# Patient Record
Sex: MALE | Race: WHITE | NOT HISPANIC OR LATINO | Employment: FULL TIME | URBAN - NONMETROPOLITAN AREA
[De-identification: names, ages, dates, MRNs, and addresses within clinical notes are randomized per-mention and may not be internally consistent; named-entity substitution may affect disease eponyms.]

---

## 2018-06-01 ENCOUNTER — HOSPITAL ENCOUNTER (OUTPATIENT)
Dept: PHYSICAL THERAPY | Facility: HOSPITAL | Age: 58
Setting detail: THERAPIES SERIES
Discharge: HOME | End: 2018-06-01

## 2018-06-05 ENCOUNTER — HOSPITAL ENCOUNTER (OUTPATIENT)
Dept: PHYSICAL THERAPY | Facility: HOSPITAL | Age: 58
Setting detail: THERAPIES SERIES
Discharge: HOME | End: 2018-06-05

## 2018-06-06 ENCOUNTER — HOSPITAL ENCOUNTER (OUTPATIENT)
Dept: PHYSICAL THERAPY | Facility: HOSPITAL | Age: 58
Setting detail: THERAPIES SERIES
Discharge: HOME | End: 2018-06-06

## 2018-06-06 PROCEDURE — 97750 PHYSICAL PERFORMANCE TEST: CPT

## 2019-02-20 RX ORDER — LISINOPRIL 40 MG/1
40 TABLET ORAL DAILY
COMMUNITY

## 2019-02-20 RX ORDER — AMLODIPINE BESYLATE 5 MG/1
5 TABLET ORAL DAILY
COMMUNITY

## 2019-02-22 ENCOUNTER — ANESTHESIA (OUTPATIENT)
Dept: PERIOP | Facility: HOSPITAL | Age: 59
End: 2019-02-22

## 2019-02-22 ENCOUNTER — ANESTHESIA EVENT (OUTPATIENT)
Dept: PERIOP | Facility: HOSPITAL | Age: 59
End: 2019-02-22

## 2019-02-22 ENCOUNTER — HOSPITAL ENCOUNTER (OUTPATIENT)
Facility: HOSPITAL | Age: 59
Setting detail: HOSPITAL OUTPATIENT SURGERY
Discharge: HOME | End: 2019-02-22
Attending: OPHTHALMOLOGY | Admitting: OPHTHALMOLOGY

## 2019-02-22 VITALS
OXYGEN SATURATION: 97 % | SYSTOLIC BLOOD PRESSURE: 148 MMHG | HEIGHT: 69 IN | DIASTOLIC BLOOD PRESSURE: 99 MMHG | RESPIRATION RATE: 20 BRPM | HEART RATE: 99 BPM | TEMPERATURE: 98 F | WEIGHT: 178 LBS | BODY MASS INDEX: 26.36 KG/M2

## 2019-02-22 PROBLEM — IMO0002 TOTAL OR MATURE SENILE CATARACT: Status: RESOLVED | Noted: 2019-02-22 | Resolved: 2019-02-22

## 2019-02-22 PROBLEM — IMO0002 TOTAL OR MATURE SENILE CATARACT: Status: ACTIVE | Noted: 2019-02-22

## 2019-02-22 PROCEDURE — 25010000002 FENTANYL CITRATE (PF) 100 MCG/2ML SOLUTION: Performed by: NURSE ANESTHETIST, CERTIFIED REGISTERED

## 2019-02-22 PROCEDURE — 25010000002 MIDAZOLAM PER 1 MG: Performed by: NURSE ANESTHETIST, CERTIFIED REGISTERED

## 2019-02-22 PROCEDURE — V2632 POST CHMBR INTRAOCULAR LENS: HCPCS | Performed by: OPHTHALMOLOGY

## 2019-02-22 DEVICE — LENS TECNIS ITEC 1PC PRELD MONO PCB00 22.5: Type: IMPLANTABLE DEVICE | Site: EYE | Status: FUNCTIONAL

## 2019-02-22 RX ORDER — TETRACAINE HYDROCHLORIDE 5 MG/ML
1 SOLUTION OPHTHALMIC ONCE
Status: COMPLETED | OUTPATIENT
Start: 2019-02-22 | End: 2019-02-22

## 2019-02-22 RX ORDER — MEPERIDINE HYDROCHLORIDE 25 MG/ML
12.5 INJECTION INTRAMUSCULAR; INTRAVENOUS; SUBCUTANEOUS
Status: DISCONTINUED | OUTPATIENT
Start: 2019-02-22 | End: 2019-02-22 | Stop reason: HOSPADM

## 2019-02-22 RX ORDER — BALANCED SALT SOLUTION ENRICHED WITH BICARBONATE, DEXTROSE, AND GLUTATHIONE
KIT INTRAOCULAR AS NEEDED
Status: DISCONTINUED | OUTPATIENT
Start: 2019-02-22 | End: 2019-02-22 | Stop reason: HOSPADM

## 2019-02-22 RX ORDER — OXYCODONE HYDROCHLORIDE AND ACETAMINOPHEN 5; 325 MG/1; MG/1
1 TABLET ORAL ONCE AS NEEDED
Status: DISCONTINUED | OUTPATIENT
Start: 2019-02-22 | End: 2019-02-22 | Stop reason: HOSPADM

## 2019-02-22 RX ORDER — ONDANSETRON 2 MG/ML
4 INJECTION INTRAMUSCULAR; INTRAVENOUS ONCE AS NEEDED
Status: DISCONTINUED | OUTPATIENT
Start: 2019-02-22 | End: 2019-02-22 | Stop reason: HOSPADM

## 2019-02-22 RX ORDER — TROPICAMIDE 10 MG/ML
1 SOLUTION/ DROPS OPHTHALMIC
Status: COMPLETED | OUTPATIENT
Start: 2019-02-22 | End: 2019-02-22

## 2019-02-22 RX ORDER — ERYTHROMYCIN 5 MG/G
OINTMENT OPHTHALMIC AS NEEDED
Status: DISCONTINUED | OUTPATIENT
Start: 2019-02-22 | End: 2019-02-22 | Stop reason: HOSPADM

## 2019-02-22 RX ORDER — SODIUM CHLORIDE 9 MG/ML
INJECTION, SOLUTION INTRAVENOUS CONTINUOUS PRN
Status: DISCONTINUED | OUTPATIENT
Start: 2019-02-22 | End: 2019-02-22 | Stop reason: SURG

## 2019-02-22 RX ORDER — PHENYLEPHRINE HCL 2.5 %
1 DROPS OPHTHALMIC (EYE)
Status: COMPLETED | OUTPATIENT
Start: 2019-02-22 | End: 2019-02-22

## 2019-02-22 RX ORDER — FENTANYL CITRATE 50 UG/ML
INJECTION, SOLUTION INTRAMUSCULAR; INTRAVENOUS AS NEEDED
Status: DISCONTINUED | OUTPATIENT
Start: 2019-02-22 | End: 2019-02-22 | Stop reason: SURG

## 2019-02-22 RX ORDER — DICLOFENAC SODIUM 1 MG/ML
1 SOLUTION/ DROPS OPHTHALMIC ONCE
Status: COMPLETED | OUTPATIENT
Start: 2019-02-22 | End: 2019-02-22

## 2019-02-22 RX ORDER — FENTANYL CITRATE 50 UG/ML
50 INJECTION, SOLUTION INTRAMUSCULAR; INTRAVENOUS
Status: DISCONTINUED | OUTPATIENT
Start: 2019-02-22 | End: 2019-02-22 | Stop reason: HOSPADM

## 2019-02-22 RX ORDER — MAGNESIUM HYDROXIDE 1200 MG/15ML
LIQUID ORAL AS NEEDED
Status: DISCONTINUED | OUTPATIENT
Start: 2019-02-22 | End: 2019-02-22 | Stop reason: HOSPADM

## 2019-02-22 RX ORDER — SODIUM CHLORIDE 0.9 % (FLUSH) 0.9 %
3-10 SYRINGE (ML) INJECTION AS NEEDED
Status: DISCONTINUED | OUTPATIENT
Start: 2019-02-22 | End: 2019-02-22 | Stop reason: HOSPADM

## 2019-02-22 RX ORDER — MIDAZOLAM HYDROCHLORIDE 1 MG/ML
INJECTION INTRAMUSCULAR; INTRAVENOUS AS NEEDED
Status: DISCONTINUED | OUTPATIENT
Start: 2019-02-22 | End: 2019-02-22 | Stop reason: SURG

## 2019-02-22 RX ORDER — SODIUM CHLORIDE 0.9 % (FLUSH) 0.9 %
3 SYRINGE (ML) INJECTION EVERY 12 HOURS SCHEDULED
Status: DISCONTINUED | OUTPATIENT
Start: 2019-02-22 | End: 2019-02-22 | Stop reason: HOSPADM

## 2019-02-22 RX ORDER — LIDOCAINE HYDROCHLORIDE 10 MG/ML
INJECTION, SOLUTION EPIDURAL; INFILTRATION; INTRACAUDAL; PERINEURAL AS NEEDED
Status: DISCONTINUED | OUTPATIENT
Start: 2019-02-22 | End: 2019-02-22 | Stop reason: HOSPADM

## 2019-02-22 RX ORDER — BALANCED SALT SOLUTION 6.4; .75; .48; .3; 3.9; 1.7 MG/ML; MG/ML; MG/ML; MG/ML; MG/ML; MG/ML
SOLUTION OPHTHALMIC AS NEEDED
Status: DISCONTINUED | OUTPATIENT
Start: 2019-02-22 | End: 2019-02-22 | Stop reason: HOSPADM

## 2019-02-22 RX ORDER — CYCLOPENTOLATE HYDROCHLORIDE 10 MG/ML
1 SOLUTION/ DROPS OPHTHALMIC
Status: COMPLETED | OUTPATIENT
Start: 2019-02-22 | End: 2019-02-22

## 2019-02-22 RX ORDER — SODIUM CHLORIDE, SODIUM LACTATE, POTASSIUM CHLORIDE, CALCIUM CHLORIDE 600; 310; 30; 20 MG/100ML; MG/100ML; MG/100ML; MG/100ML
125 INJECTION, SOLUTION INTRAVENOUS CONTINUOUS
Status: DISCONTINUED | OUTPATIENT
Start: 2019-02-22 | End: 2019-02-22 | Stop reason: HOSPADM

## 2019-02-22 RX ORDER — IPRATROPIUM BROMIDE AND ALBUTEROL SULFATE 2.5; .5 MG/3ML; MG/3ML
3 SOLUTION RESPIRATORY (INHALATION) ONCE AS NEEDED
Status: DISCONTINUED | OUTPATIENT
Start: 2019-02-22 | End: 2019-02-22 | Stop reason: HOSPADM

## 2019-02-22 RX ORDER — CIPROFLOXACIN HYDROCHLORIDE 3.5 MG/ML
1 SOLUTION/ DROPS TOPICAL
Status: COMPLETED | OUTPATIENT
Start: 2019-02-22 | End: 2019-02-22

## 2019-02-22 RX ORDER — TETRACAINE HYDROCHLORIDE 5 MG/ML
SOLUTION OPHTHALMIC AS NEEDED
Status: DISCONTINUED | OUTPATIENT
Start: 2019-02-22 | End: 2019-02-22 | Stop reason: HOSPADM

## 2019-02-22 RX ADMIN — TETRACAINE HYDROCHLORIDE 1 DROP: 5 SOLUTION OPHTHALMIC at 08:12

## 2019-02-22 RX ADMIN — TROPICAMIDE 1 DROP: 10 SOLUTION/ DROPS OPHTHALMIC at 08:12

## 2019-02-22 RX ADMIN — MIDAZOLAM HYDROCHLORIDE 2 MG: 1 INJECTION, SOLUTION INTRAMUSCULAR; INTRAVENOUS at 08:33

## 2019-02-22 RX ADMIN — CIPROFLOXACIN HYDROCHLORIDE 1 DROP: 3 SOLUTION/ DROPS OPHTHALMIC at 08:23

## 2019-02-22 RX ADMIN — CIPROFLOXACIN HYDROCHLORIDE 1 DROP: 3 SOLUTION/ DROPS OPHTHALMIC at 08:18

## 2019-02-22 RX ADMIN — FENTANYL CITRATE 50 MCG: 50 INJECTION INTRAMUSCULAR; INTRAVENOUS at 08:33

## 2019-02-22 RX ADMIN — SODIUM CHLORIDE: 9 INJECTION, SOLUTION INTRAVENOUS at 08:30

## 2019-02-22 RX ADMIN — CYCLOPENTOLATE HYDROCHLORIDE 1 DROP: 10 SOLUTION/ DROPS OPHTHALMIC at 08:12

## 2019-02-22 RX ADMIN — TROPICAMIDE 1 DROP: 10 SOLUTION/ DROPS OPHTHALMIC at 08:23

## 2019-02-22 RX ADMIN — MIDAZOLAM HYDROCHLORIDE 2 MG: 1 INJECTION, SOLUTION INTRAMUSCULAR; INTRAVENOUS at 08:47

## 2019-02-22 RX ADMIN — TROPICAMIDE 1 DROP: 10 SOLUTION/ DROPS OPHTHALMIC at 08:18

## 2019-02-22 RX ADMIN — CYCLOPENTOLATE HYDROCHLORIDE 1 DROP: 10 SOLUTION/ DROPS OPHTHALMIC at 08:18

## 2019-02-22 RX ADMIN — DICLOFENAC SODIUM 1 DROP: 1 SOLUTION OPHTHALMIC at 08:11

## 2019-02-22 RX ADMIN — FENTANYL CITRATE 50 MCG: 50 INJECTION INTRAMUSCULAR; INTRAVENOUS at 08:48

## 2019-02-22 RX ADMIN — PHENYLEPHRINE HYDROCHLORIDE 1 DROP: 2.5 SOLUTION/ DROPS OPHTHALMIC at 08:18

## 2019-02-22 RX ADMIN — PHENYLEPHRINE HYDROCHLORIDE 1 DROP: 2.5 SOLUTION/ DROPS OPHTHALMIC at 08:23

## 2019-02-22 RX ADMIN — CIPROFLOXACIN HYDROCHLORIDE 1 DROP: 3 SOLUTION/ DROPS OPHTHALMIC at 08:10

## 2019-02-22 RX ADMIN — PHENYLEPHRINE HYDROCHLORIDE 1 DROP: 2.5 SOLUTION/ DROPS OPHTHALMIC at 08:12

## 2019-02-22 NOTE — OP NOTE
Cataract Surgery Procedure Note     Pre-op diagnosis and indications:  Visually significant total opaque, NS and cortical cataract left eye    Post-op diagnosis:  Same    Procedure performed:  Extracapsular cataract extraction using phacoemulsification and posterior chamber intra-ocular lens implant left eye    Surgeon: Alex Pagan M.D.    Anesthesia:  Topical tetracaine drops, intracameral lidocaine, and monitored anesthesia care.    Complications:  No apparent pre-operative, intra-operative, or immediate post-operative complications.    Lens implant used:  Technis PCBOO with dioptric power of 22.5    Specimens:  Not applicable    Blood loss:  None     Blood Products: NA    Grafts or Implants: see above    Description of procedure:     The patient was informed of the risks, indications, and alternative to this elective surgical procedure to perform cataract removal and intraocular lens implantation in the affected eye.  All of the patients and patient's caretakers questions have been answered to their satisfaction and an informed consent form has been reviewed with and signed by the patient and/or caretaker.  The informed consent form is in the chart.    The patient was instructed to clean the eyelashes with baby shampoo each day for several days before surgery and to apply antibiotic ointment to the eyelashes at bedtime for several days before surgery.  The patient was instructed to use an antibiotic drop in the operative eye four times a day for 3 days before surgery.      On the day of surgery, the patient was brought to the pre-operative holding area and given dilating drops (cyclogyl, phenylephrine, and tropicamide) x 3 5 minutes apart.  The patient also received antibiotic drops and voltaren drops x 3 5 minutes apart in the pre-operative area.     Once the patient's eye was adequetly dilated, the patient was brought to the operative room and placed in the supine position. The patient was monitored  by anesthesia care services throughout the procedure.      After performing the appropriate time-outs, the patient received a betadine prep, cleansing the eyelashes with sterile betadine, followed by cleansing of the periorbital skin with sterile betadine, followed by a few drops of betadine placed in the inferior fornix.  The eye was rinsed with sterile saline and blotted dry with a sterile towel.  The patient was draped in the usual sterile fashion and sterile precautions were used throughout the procedure.    The eye was visualized through the microscope.  A lid speculum was placed on the eyelids to open the lids.  A drop of sterile tetracaine was once again placed on the eye.  A small paracentesis was created with a 1 mm blade near the limbus.  Through this paracentesis an injection of a small amount 2 % non-preserved lidocaine was given into the anterior chamber with a cannula.  Also, viscoat was given with a cannula into the anterior chamber.  A 2.85 mm keratome blade was used to create a clear corneal tunnel incision near the limbus temporally. Additional viscoat was injected with a cannula into the anterior chamber.  A cystitome was used to create a small tear in the central portion of the anterior capsule.  Utrata forceps were used to create an appropriate sized continuous curvilinear capsularhexis.  Gentle hydrodissection and hydrodilineation of the nucleus was performed with sterile balanced salt solution with a cannula.  The phaco probe was used to carve a central groove in the nucleus.  Viscoat was injected into the grove.  The Shanon nucleus cracker was used to divide the nucleus into two sections.  Each section was gently phacoemulsified using as little phaco energy as possible.  Cortical clean up was then performed with the irrigation and aspiration probe.  An injection of a small amount of  provisc was given into the posterior capsule with a cannula.  A  was used to gently place the  selected lens into the posterior capsule. The michaelle wand was used to gently rotate the lens into the proper position.  The lens appeared to be well centered in the posterior capsular bag.  The irrigation and aspiration probe was used to remove the provisc and viscoat and any remaining cortical material.  A final irrigation of the eye was performed with a cannula using balanced salt solution from the irrigation bottle.      The wound was checked and found to be water tight and secure.  The lid speculum was removed and the drapes were removed.  Tobradex ointment was given to the eye.  A shield was placed over the eye.      The patient was transported to the post-operative recovery room.  The patient tolerated the procedure well and there were no apparent pre-operative, intra-operative, or immediate post-operative complications.      The patient was instructed to follow up in the eye clinic in the afternoon (same day).  The patient was instructed to avoid all strenuous activity and to keep the eye protected with the shield.  The patient was instructed to return to the eye clinic or the emergency room immediately for any problems.      Addendum:  We stained the anterior capsule with trypan blue dye just before performing the capsularhesis.  A small amount of trypan blue dye was injected with a cannula on to the surface of the capsule and the bss was used to rinse out the excess dye.  Viscoat was placed back into the anteior chamber.

## 2019-02-22 NOTE — BRIEF OP NOTE
CATARACT PHACO EXTRACTION WITH INTRAOCULAR LENS IMPLANT  Progress Note    Josué Hooper  2/22/2019    Pre-op Diagnosis:   H25.89       Post-Op Diagnosis Codes:  Total opaque cataract left eye, also ns and cortical cat left eye H25.12, H25.012    Procedure/CPT® Codes:      Procedure(s):  LEFT CATARACT PHACO EXTRACTION WITH INTRAOCULAR LENS IMPLANT    Surgeon(s):  Alex Pagan MD    Anesthesia: Choice    Staff:   Circulator: Janessa Goldsmith RN  Scrub Person: Kelli Lamb; Trini Robles    Estimated Blood Loss: none    Urine Voided: * No values recorded between 2/22/2019  8:31 AM and 2/22/2019  9:16 AM *    Specimens:                None      Drains:      Findings: see op report     Complications: none apparent      Alex Pagan MD     Date: 2/22/2019  Time: 9:19 AM

## 2019-02-22 NOTE — ANESTHESIA POSTPROCEDURE EVALUATION
Patient: Josué Hooper    Procedure Summary     Date:  02/22/19 Room / Location:  [facility] OR 09 /  [facility] OR    Anesthesia Start:  0830 Anesthesia Stop:  0919    Procedure:  LEFT CATARACT PHACO EXTRACTION WITH INTRAOCULAR LENS IMPLANT (Left Eye) Diagnosis:  (H25.89)    Surgeon:  Alex Pagan MD Provider:  Bruce Ruiz DO    Anesthesia Type:  general ASA Status:  2          Anesthesia Type: general  Last vitals  BP   148/99 (02/22/19 0920)   Temp   98 °F (36.7 °C) (02/22/19 0920)   Pulse   99 (02/22/19 0920)   Resp   20 (02/22/19 0920)     SpO2   97 % (02/22/19 0920)     Post Anesthesia Care and Evaluation    Patient location during evaluation: PHASE II  Patient participation: complete - patient participated  Level of consciousness: awake and alert  Pain score: 1  Pain management: adequate  Airway patency: patent  Anesthetic complications: No anesthetic complications  PONV Status: controlled  Cardiovascular status: acceptable  Respiratory status: acceptable  Hydration status: acceptable

## 2019-02-22 NOTE — ANESTHESIA PREPROCEDURE EVALUATION
Anesthesia Evaluation     no history of anesthetic complications:  NPO Solid Status: > 8 hours             Airway   Mallampati: II  TM distance: >3 FB  Neck ROM: full  No difficulty expected  Dental - normal exam     Pulmonary - normal exam   Cardiovascular - normal exam    (+) hypertension,       Neuro/Psych  GI/Hepatic/Renal/Endo      Musculoskeletal     Abdominal  - normal exam    Bowel sounds: normal.   Substance History      OB/GYN          Other                        Anesthesia Plan    ASA 2     general     intravenous induction   Anesthetic plan, all risks, benefits, and alternatives have been provided, discussed and informed consent has been obtained with: patient.    Plan discussed with CRNA.

## (undated) DEVICE — HOLDER: Brand: DEROYAL

## (undated) DEVICE — CARTRDG LENS MONARCHII C IOL DS

## (undated) DEVICE — PK TBG FUSN PHACO WHITESTAR SIGN

## (undated) DEVICE — ENCORE® LATEX MICRO SIZE 7, STERILE LATEX POWDER-FREE SURGICAL GLOVE: Brand: ENCORE

## (undated) DEVICE — PK PHACO 70